# Patient Record
Sex: FEMALE | Race: WHITE | Employment: FULL TIME | URBAN - METROPOLITAN AREA
[De-identification: names, ages, dates, MRNs, and addresses within clinical notes are randomized per-mention and may not be internally consistent; named-entity substitution may affect disease eponyms.]

---
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
## 2024-10-30 ENCOUNTER — HOSPITAL ENCOUNTER (EMERGENCY)
Age: 24
Discharge: HOME | End: 2024-10-30
Attending: STUDENT IN AN ORGANIZED HEALTH CARE EDUCATION/TRAINING PROGRAM
Payer: COMMERCIAL

## 2024-10-30 VITALS
DIASTOLIC BLOOD PRESSURE: 81 MMHG | HEART RATE: 97 BPM | RESPIRATION RATE: 16 BRPM | TEMPERATURE: 97.9 F | BODY MASS INDEX: 26.34 KG/M2 | WEIGHT: 167.8 LBS | OXYGEN SATURATION: 98 % | HEIGHT: 67 IN | SYSTOLIC BLOOD PRESSURE: 124 MMHG

## 2024-10-30 DIAGNOSIS — S81.812A LACERATION OF LEFT LOWER EXTREMITY, INITIAL ENCOUNTER: Primary | ICD-10-CM

## 2024-10-30 PROCEDURE — 99284 EMERGENCY DEPT VISIT MOD MDM: CPT

## 2024-10-30 PROCEDURE — 6360000002 HC RX W HCPCS: Performed by: STUDENT IN AN ORGANIZED HEALTH CARE EDUCATION/TRAINING PROGRAM

## 2024-10-30 PROCEDURE — 12031 INTMD RPR S/A/T/EXT 2.5 CM/<: CPT

## 2024-10-30 PROCEDURE — 90715 TDAP VACCINE 7 YRS/> IM: CPT | Performed by: STUDENT IN AN ORGANIZED HEALTH CARE EDUCATION/TRAINING PROGRAM

## 2024-10-30 PROCEDURE — 6370000000 HC RX 637 (ALT 250 FOR IP): Performed by: STUDENT IN AN ORGANIZED HEALTH CARE EDUCATION/TRAINING PROGRAM

## 2024-10-30 PROCEDURE — 90471 IMMUNIZATION ADMIN: CPT | Performed by: STUDENT IN AN ORGANIZED HEALTH CARE EDUCATION/TRAINING PROGRAM

## 2024-10-30 RX ORDER — BACITRACIN ZINC AND POLYMYXIN B SULFATE 500; 1000 [USP'U]/G; [USP'U]/G
OINTMENT TOPICAL 2 TIMES DAILY
Status: DISCONTINUED | OUTPATIENT
Start: 2024-10-30 | End: 2024-10-30 | Stop reason: HOSPADM

## 2024-10-30 RX ADMIN — TETANUS TOXOID, REDUCED DIPHTHERIA TOXOID AND ACELLULAR PERTUSSIS VACCINE, ADSORBED 0.5 ML: 5; 2.5; 8; 8; 2.5 SUSPENSION INTRAMUSCULAR at 07:31

## 2024-10-30 RX ADMIN — BACITRACIN ZINC AND POLYMYXIN B SULFATE 28.3 G: at 07:32

## 2024-10-30 ASSESSMENT — LIFESTYLE VARIABLES
HOW MANY STANDARD DRINKS CONTAINING ALCOHOL DO YOU HAVE ON A TYPICAL DAY: PATIENT DOES NOT DRINK
HOW OFTEN DO YOU HAVE A DRINK CONTAINING ALCOHOL: NEVER

## 2024-10-30 ASSESSMENT — PAIN - FUNCTIONAL ASSESSMENT: PAIN_FUNCTIONAL_ASSESSMENT: NONE - DENIES PAIN

## 2024-10-30 NOTE — ED PROVIDER NOTES
[facility] ED     EMERGENCY DEPARTMENT ENCOUNTER         Pt Name: Rosanna Hernandez   MRN: [id number]   Birthdate 2000   Date of evaluation: 10/30/2024   Provider: Heron Reynaga MD   PCP: No primary care provider on file.   Note Started: 7:31 AM EDT 10/30/24       Chief Complaint     Laceration (Pt arrives with c/o laceration to left knee. Pt states she fell over something on the floor and cut knee on tile on the ground approx 1-2 hours PTA. Bleeding controlled.)      History of Present Illness     Rosanna Hernandez is a 24 y.o. female who presents with laceration to left knee.  The patient has no major contributing past medical history she cannot recall her last tetanus immunization.  She states that early this morning she tripped while preparing food striking her left knee with laceration on a tile floor.  No other significant injuries she denies striking her head or loss of consciousness.  Bleeding was controlled from the laceration of the left knee with direct pressure and given the depth of the wound she presents for evaluation      Review of Systems     Positives and pertinent negatives as per HPI.    Past Medical, Surgical, Family, and Social History     She has a past medical history of ADHD (attention deficit hyperactivity disorder) and Anxiety.  She has no past surgical history on file.  Her family history is not on file.  She reports that she has been smoking e-cigarettes. She has never used smokeless tobacco. She reports that she does not currently use alcohol. She reports that she does not use drugs.    SCREENINGS:          [place] Coma Scale  Eye Opening: Spontaneous  Best Verbal Response: Oriented  Best Motor Response: Obeys commands  [place] Coma Scale Score: 15                        CIWA Assessment  BP: 124/81  Pulse: 97               Medications     Previous Medications    ONDANSETRON (ZOFRAN) 4 MG TABLET    Take 1 tablet by mouth every 8 hours as needed for Nausea  return at 10 to 14 days for suture removal.  The patient is discharged in stable condition.         Additional Reassessment    Is this patient to be included in the SEP-1 core measure? No Exclusion criteria - the patient is NOT to be included for SEP-1 Core Measure due to: Infection is not suspected    Medical Decision Making  Presentation mechanical fall with left knee laceration repaired primarily amenable to routine outpatient care    Problems Addressed:  Laceration of left lower extremity, initial encounter: acute illness or injury    Risk  OTC drugs.  Prescription drug management.  Decision regarding hospitalization.          The patient was given the followingmedications:  Orders Placed This Encounter   Medications    DISCONTD: Tetanus-Diphth-Acell Pertussis (BOOSTRIX) injection 0.5 mL    tetanus-diphth-acell pertussis (BOOSTRIX) injection 0.5 mL    bacitracin-polymyxin b (POLYSPORIN) ointment       CONSULTS:  None      CRITICAL CARE TIME   Total Critical Care time was 0 minutes, excluding separately reportable procedures in the context of the following condition na.  There was a high probability of clinically significant/life threatening deterioration in the patient's condition which required my urgent intervention.    Clinical Impression     1. Laceration of left lower extremity, initial encounter        Disposition     PATIENT REFERRED TO:  No follow-up provider specified.    DISCHARGE MEDICATIONS:  New Prescriptions    No medications on file       DISPOSITION Decision To Discharge 10/30/2024 06:30:21 AM           Heron Reynaga MD (electronically signed)  Attending Emergency Physician    Please note this documentation has been produced using speech recognition software and may contain errors related to that system including errors in grammar, punctuation, and spelling, as well as words and phrases that may be inappropriate.  Efforts were made to edit the dictations.         Heron Reynaga MD  10/30/24

## 2025-03-29 ENCOUNTER — APPOINTMENT (OUTPATIENT)
Dept: ULTRASOUND IMAGING | Age: 25
End: 2025-03-29
Payer: COMMERCIAL

## 2025-03-29 ENCOUNTER — HOSPITAL ENCOUNTER (EMERGENCY)
Age: 25
Discharge: HOME | End: 2025-03-29
Attending: EMERGENCY MEDICINE
Payer: COMMERCIAL

## 2025-03-29 ENCOUNTER — APPOINTMENT (OUTPATIENT)
Dept: CT IMAGING | Age: 25
End: 2025-03-29
Payer: COMMERCIAL

## 2025-03-29 VITALS
OXYGEN SATURATION: 96 % | HEIGHT: 67 IN | SYSTOLIC BLOOD PRESSURE: 146 MMHG | DIASTOLIC BLOOD PRESSURE: 83 MMHG | HEART RATE: 96 BPM | WEIGHT: 162 LBS | TEMPERATURE: 98.2 F | BODY MASS INDEX: 25.43 KG/M2 | RESPIRATION RATE: 16 BRPM

## 2025-03-29 DIAGNOSIS — N20.0 KIDNEY STONE: ICD-10-CM

## 2025-03-29 DIAGNOSIS — D72.829 LEUKOCYTOSIS, UNSPECIFIED TYPE: ICD-10-CM

## 2025-03-29 DIAGNOSIS — S32.038S OTHER CLOSED FRACTURE OF THIRD LUMBAR VERTEBRA, SEQUELA: ICD-10-CM

## 2025-03-29 DIAGNOSIS — R03.0 ELEVATED BLOOD PRESSURE READING: ICD-10-CM

## 2025-03-29 DIAGNOSIS — N30.01 ACUTE CYSTITIS WITH HEMATURIA: Primary | ICD-10-CM

## 2025-03-29 LAB
ALBUMIN SERPL-MCNC: 5 G/DL (ref 3.4–5)
ALBUMIN/GLOB SERPL: 2.4 {RATIO} (ref 1.1–2.2)
ALP SERPL-CCNC: 74 U/L (ref 40–129)
ALT SERPL-CCNC: 17 U/L (ref 10–40)
ANION GAP SERPL CALCULATED.3IONS-SCNC: 14 MMOL/L (ref 3–16)
AST SERPL-CCNC: 16 U/L (ref 15–37)
BACTERIA URNS QL MICRO: ABNORMAL /HPF
BASOPHILS # BLD: 0 K/UL (ref 0–0.2)
BASOPHILS NFR BLD: 0.2 %
BILIRUB SERPL-MCNC: 0.3 MG/DL (ref 0–1)
BILIRUB UR QL STRIP.AUTO: NEGATIVE
BUN SERPL-MCNC: 10 MG/DL (ref 7–20)
CALCIUM SERPL-MCNC: 9.3 MG/DL (ref 8.3–10.6)
CHLORIDE SERPL-SCNC: 106 MMOL/L (ref 99–110)
CLARITY UR: ABNORMAL
CO2 SERPL-SCNC: 20 MMOL/L (ref 21–32)
COLOR UR: YELLOW
CREAT SERPL-MCNC: 0.7 MG/DL (ref 0.6–1.1)
DEPRECATED RDW RBC AUTO: 15.2 % (ref 12.4–15.4)
EOSINOPHIL # BLD: 0 K/UL (ref 0–0.6)
EOSINOPHIL NFR BLD: 0.3 %
EPI CELLS #/AREA URNS HPF: ABNORMAL /HPF (ref 0–5)
GFR SERPLBLD CREATININE-BSD FMLA CKD-EPI: >90 ML/MIN/{1.73_M2}
GLUCOSE SERPL-MCNC: 116 MG/DL (ref 70–99)
GLUCOSE UR STRIP.AUTO-MCNC: NEGATIVE MG/DL
HCG SERPL QL: NEGATIVE
HCT VFR BLD AUTO: 41.5 % (ref 36–48)
HGB BLD-MCNC: 13.6 G/DL (ref 12–16)
HGB UR QL STRIP.AUTO: ABNORMAL
KETONES UR STRIP.AUTO-MCNC: NEGATIVE MG/DL
LEUKOCYTE ESTERASE UR QL STRIP.AUTO: ABNORMAL
LIPASE SERPL-CCNC: 14 U/L (ref 13–60)
LYMPHOCYTES # BLD: 2.8 K/UL (ref 1–5.1)
LYMPHOCYTES NFR BLD: 21.2 %
MCH RBC QN AUTO: 28 PG (ref 26–34)
MCHC RBC AUTO-ENTMCNC: 32.9 G/DL (ref 31–36)
MCV RBC AUTO: 85.1 FL (ref 80–100)
MONOCYTES # BLD: 0.7 K/UL (ref 0–1.3)
MONOCYTES NFR BLD: 5.1 %
MUCOUS THREADS #/AREA URNS LPF: ABNORMAL /LPF
NEUTROPHILS # BLD: 9.5 K/UL (ref 1.7–7.7)
NEUTROPHILS NFR BLD: 73.2 %
NITRITE UR QL STRIP.AUTO: POSITIVE
PH UR STRIP.AUTO: 5.5 [PH] (ref 5–8)
PLATELET # BLD AUTO: 344 K/UL (ref 135–450)
PMV BLD AUTO: 7.5 FL (ref 5–10.5)
POTASSIUM SERPL-SCNC: 3.9 MMOL/L (ref 3.5–5.1)
PROT SERPL-MCNC: 7.1 G/DL (ref 6.4–8.2)
PROT UR STRIP.AUTO-MCNC: 100 MG/DL
RBC # BLD AUTO: 4.88 M/UL (ref 4–5.2)
RBC #/AREA URNS HPF: >100 /HPF (ref 0–4)
SODIUM SERPL-SCNC: 140 MMOL/L (ref 136–145)
SP GR UR STRIP.AUTO: 1.02 (ref 1–1.03)
UA COMPLETE W REFLEX CULTURE PNL UR: ABNORMAL
UA DIPSTICK W REFLEX MICRO PNL UR: YES
URN SPEC COLLECT METH UR: ABNORMAL
UROBILINOGEN UR STRIP-ACNC: 0.2 E.U./DL
WBC # BLD AUTO: 13 K/UL (ref 4–11)
WBC #/AREA URNS HPF: ABNORMAL /HPF (ref 0–5)

## 2025-03-29 PROCEDURE — 74177 CT ABD & PELVIS W/CONTRAST: CPT

## 2025-03-29 PROCEDURE — 87077 CULTURE AEROBIC IDENTIFY: CPT

## 2025-03-29 PROCEDURE — 96374 THER/PROPH/DIAG INJ IV PUSH: CPT

## 2025-03-29 PROCEDURE — 84703 CHORIONIC GONADOTROPIN ASSAY: CPT

## 2025-03-29 PROCEDURE — 81001 URINALYSIS AUTO W/SCOPE: CPT

## 2025-03-29 PROCEDURE — 96375 TX/PRO/DX INJ NEW DRUG ADDON: CPT

## 2025-03-29 PROCEDURE — 85025 COMPLETE CBC W/AUTO DIFF WBC: CPT

## 2025-03-29 PROCEDURE — 6360000002 HC RX W HCPCS: Performed by: EMERGENCY MEDICINE

## 2025-03-29 PROCEDURE — 87086 URINE CULTURE/COLONY COUNT: CPT

## 2025-03-29 PROCEDURE — 80053 COMPREHEN METABOLIC PANEL: CPT

## 2025-03-29 PROCEDURE — 6360000002 HC RX W HCPCS: Performed by: PHYSICIAN ASSISTANT

## 2025-03-29 PROCEDURE — 87186 SC STD MICRODIL/AGAR DIL: CPT

## 2025-03-29 PROCEDURE — 99285 EMERGENCY DEPT VISIT HI MDM: CPT

## 2025-03-29 PROCEDURE — 83690 ASSAY OF LIPASE: CPT

## 2025-03-29 PROCEDURE — 2500000003 HC RX 250 WO HCPCS: Performed by: EMERGENCY MEDICINE

## 2025-03-29 PROCEDURE — 76856 US EXAM PELVIC COMPLETE: CPT

## 2025-03-29 PROCEDURE — 6360000004 HC RX CONTRAST MEDICATION: Performed by: PHYSICIAN ASSISTANT

## 2025-03-29 RX ORDER — IOPAMIDOL 755 MG/ML
75 INJECTION, SOLUTION INTRAVASCULAR
Status: COMPLETED | OUTPATIENT
Start: 2025-03-29 | End: 2025-03-29

## 2025-03-29 RX ORDER — KETOROLAC TROMETHAMINE 30 MG/ML
15 INJECTION, SOLUTION INTRAMUSCULAR; INTRAVENOUS ONCE
Status: COMPLETED | OUTPATIENT
Start: 2025-03-29 | End: 2025-03-29

## 2025-03-29 RX ORDER — ONDANSETRON 2 MG/ML
4 INJECTION INTRAMUSCULAR; INTRAVENOUS ONCE
Status: COMPLETED | OUTPATIENT
Start: 2025-03-29 | End: 2025-03-29

## 2025-03-29 RX ORDER — CEFDINIR 300 MG/1
300 CAPSULE ORAL 2 TIMES DAILY
Qty: 14 CAPSULE | Refills: 0 | Status: SHIPPED | OUTPATIENT
Start: 2025-03-29 | End: 2025-04-05

## 2025-03-29 RX ORDER — ONDANSETRON 4 MG/1
4 TABLET, ORALLY DISINTEGRATING ORAL 3 TIMES DAILY PRN
Qty: 21 TABLET | Refills: 0 | Status: SHIPPED | OUTPATIENT
Start: 2025-03-29

## 2025-03-29 RX ADMIN — WATER 1000 MG: 1 INJECTION INTRAMUSCULAR; INTRAVENOUS; SUBCUTANEOUS at 17:01

## 2025-03-29 RX ADMIN — KETOROLAC TROMETHAMINE 15 MG: 30 INJECTION, SOLUTION INTRAMUSCULAR at 16:00

## 2025-03-29 RX ADMIN — IOPAMIDOL 75 ML: 755 INJECTION, SOLUTION INTRAVENOUS at 16:14

## 2025-03-29 RX ADMIN — ONDANSETRON 4 MG: 2 INJECTION, SOLUTION INTRAMUSCULAR; INTRAVENOUS at 16:00

## 2025-03-29 ASSESSMENT — PAIN - FUNCTIONAL ASSESSMENT: PAIN_FUNCTIONAL_ASSESSMENT: 0-10

## 2025-03-29 ASSESSMENT — PAIN DESCRIPTION - PAIN TYPE: TYPE: ACUTE PAIN

## 2025-03-29 ASSESSMENT — PAIN SCALES - GENERAL
PAINLEVEL_OUTOF10: 3
PAINLEVEL_OUTOF10: 6

## 2025-03-29 ASSESSMENT — PAIN DESCRIPTION - ORIENTATION: ORIENTATION: LEFT

## 2025-03-29 ASSESSMENT — PAIN DESCRIPTION - LOCATION: LOCATION: FLANK

## 2025-03-29 NOTE — ED PROVIDER NOTES
THIS IS MY RAD[facility] SUPERVISORY AND SHARED VISIT NOTE:    I personally saw the patient and made/approved the management plan and take responsibility for the patient management.      I independently performed a history and physical on Rosanna Hernandez.   All diagnostic, treatment, and disposition decisions were made by myself in conjunction with the advanced practice provider. I personally saw the patient and performed a substantive portion of the visit including all aspects of the medical decision making.      For further details of Rosanna Hernandez's emergency department encounter, please see RIAN Wen's documentation.      History: Patient is a 25-year-old female presenting today due to concern for sudden onset left flank and abdominal pain of the left lower quadrant starting at 1340 today.  She reports having a kidney stone in the past and states that felt somewhat similar.  She did have some nausea with vomiting.  She denies any fever but did start sweating at the time of the pain.  She denies any chest pain or shortness of breath.  No reported numbness or weakness in the legs.  She was on her menstrual cycle a few days ago but felt that this pain was worse than typical period cramps.  No reported falls or trauma or syncope.  No reported dysuria.  She is currently with her grandmother and did give verbal consent to ask questions in front of her.  Due to concern for significant left flank and abdominal pain, she came to the emergency department for further assessment.  By time I was asked to see her she reported her pain was greatly improved compared to when she first arrived        Physical exam:   Gen: No acute distress.  Alert and answering questions appropriately.  Psych: Normal mood and affect  HEENT: NCAT, MMM  Neck: supple, normal ROM   Cardiac: RRR, pulses 2+ in upper extremities  Lungs: C2AB, no R/R/W  Abdomen: soft and nontender with no R/D/G, no CVA tenderness bilaterally  Back: No midline  tenderness to thoracic or lumbar spinal region  Neuro: GCS equals 15, moving all extremities equally        I independently interpreted the following study(s) labs which show urinalysis was concerning for infection with bacteria along with red blood cells.  Her pregnancy test was negative.  She had a mild leukocytosis at 13.0.    I personally discussed the patient's management with Dr. Gaitan with urology, who stated if there is concern for kidney stone that she can be discharged with antibiotics and they can follow-up with her this upcoming week and this discussion was had with him prior to CT result coming back.            I was the primary provider for the patient along with RIAN Cedillo.    MDM: Patient was evaluated due to concern for abdominal pain and flank pain starting earlier today although feeling much better by the time I saw her.  CT was obtained showing in my opinion a left distal ureteral stone although radiologist read as normal and also appeared to be a calcification in the bladder which could suggest recently passed stone.  Urinalysis was concerning for infection and therefore I did start her on IV Rocephin.  I spoke to urology prior to CT result coming back and he reported there was concern for stone and that she can be discharged with follow-up later this week if needed.  Since there was no definitive stone per radiologist, I did order ultrasound which was negative for torsion or ovarian findings per radiologist.  At this point, since she was feeling much better and vitals are stable, I do believe she is safe for discharge with antibiotics orally.  She was told to take Tylenol or ibuprofen as needed for pain.  She is aware that if symptoms worsen over the next 12 to 24 hours with persistent vomiting, increasing pain, fever, new onset chest pain or any other concerns, then return to the emergency department immediately, but otherwise follow-up as an outpatient at least with primary doctor and if

## 2025-03-29 NOTE — ED PROVIDER NOTES
[facility] EMERGENCY DEPARTMENT  EMERGENCY DEPARTMENT ENCOUNTER        Pt Name: Rosanna Hernandez  MRN: [id number]  Birthdate 2000  Date of evaluation: 3/29/2025  Provider: America Cedillo PA-C  PCP: No primary care provider on file.  Note Started: 4:25 PM EDT 3/29/25       I have seen and evaluated this patient with my supervising physician Sushant Lynn MD.    CHIEF COMPLAINT       Chief Complaint   Patient presents with    Flank Pain     Left flank pain 1340 today       HISTORY OF PRESENT ILLNESS: 1 or more Elements     History From: Patient    Limitations to history : None    Social Determinants Significantly Affecting Health : None    Chief Complaint:Flank pain    Rosanna Hernandez is a 25 y.o. female with a PMHx of ADHD, anxiety, who presents to the ED with left sided abdominal pain and suprapubic pain that began today, associated with nausea without vomiting.  She denies dysuria, hematuria, flank pain, constipation, diarrhea, headache, fevers, rashes, vaginal discharge, vaginal irritation.  She reports she had a kidney stone that never passed while she was in high school, and she feels like this pain is similar.  She reports she is on her period currently.  She reports she did give birth last year via vaginal delivery, is not currently breast-feeding..     Nursing Notes were all reviewed and agreed with or any disagreements were addressed in the HPI.    REVIEW OF SYSTEMS :      Review of Systems    Positives and Pertinent negatives as per HPI.     SURGICAL HISTORY   History reviewed. No pertinent surgical history.    CURRENTMEDICATIONS       Discharge Medication List as of 3/29/2025  6:42 PM        CONTINUE these medications which have NOT CHANGED    Details   ondansetron (ZOFRAN) 4 MG tablet Take 1 tablet by mouth every 8 hours as needed for Nausea, Disp-10 tablet, R-0Print      tamsulosin (FLOMAX) 0.4 MG capsule Take 1 capsule by mouth daily for 15 days, Disp-15 capsule, R-0Print

## 2025-03-29 NOTE — DISCHARGE INSTRUCTIONS
Take Tylenol or ibuprofen as needed for pain.  Take Zofran as needed for nausea.  Take cefdinir due to concern for urinary tract infection.    Follow-up with your primary doctor over the next 2 to 3 days for any other concerns.    Have your blood pressure rechecked when you are not in pain and if still elevated, you may need medication for this.    Follow-up with urology within the next 3 to 5 days if symptoms persist in case additional treatment is needed.    Return to the emergency department over the next 12 to 24 hours for any worsening pain with vomiting, fever, or any other concerns.

## 2025-03-30 LAB
BACTERIA UR CULT: ABNORMAL
ORGANISM: ABNORMAL

## 2025-03-31 LAB
BACTERIA UR CULT: ABNORMAL
ORGANISM: ABNORMAL

## 2025-04-01 ENCOUNTER — RESULTS FOLLOW-UP (OUTPATIENT)
Dept: EMERGENCY DEPT | Age: 25
End: 2025-04-01

## 2025-04-01 NOTE — RESULT ENCOUNTER NOTE
Culture result reviewed, no further treatment needed as patient was discharged home on Omnicef and culture sensitivities were pansensitive to cephalosporins.